# Patient Record
Sex: FEMALE | Race: WHITE | NOT HISPANIC OR LATINO | URBAN - METROPOLITAN AREA
[De-identification: names, ages, dates, MRNs, and addresses within clinical notes are randomized per-mention and may not be internally consistent; named-entity substitution may affect disease eponyms.]

---

## 2017-01-30 ENCOUNTER — OUTPATIENT (OUTPATIENT)
Dept: OUTPATIENT SERVICES | Facility: HOSPITAL | Age: 42
LOS: 1 days | End: 2017-01-30
Payer: COMMERCIAL

## 2017-01-30 DIAGNOSIS — Z3A.37 37 WEEKS GESTATION OF PREGNANCY: ICD-10-CM

## 2017-01-30 LAB
HCT VFR BLD CALC: 35.8 % — SIGNIFICANT CHANGE UP (ref 34.5–45)
HGB BLD-MCNC: 12.3 G/DL — SIGNIFICANT CHANGE UP (ref 11.5–15.5)
MCHC RBC-ENTMCNC: 30.8 PG — SIGNIFICANT CHANGE UP (ref 27–34)
MCHC RBC-ENTMCNC: 34.4 G/DL — SIGNIFICANT CHANGE UP (ref 32–36)
MCV RBC AUTO: 89.7 FL — SIGNIFICANT CHANGE UP (ref 80–100)
PLATELET # BLD AUTO: 275 K/UL — SIGNIFICANT CHANGE UP (ref 150–400)
RBC # BLD: 3.99 M/UL — SIGNIFICANT CHANGE UP (ref 3.8–5.2)
RBC # FLD: 13 % — SIGNIFICANT CHANGE UP (ref 10.3–16.9)
WBC # BLD: 9.3 K/UL — SIGNIFICANT CHANGE UP (ref 3.8–10.5)
WBC # FLD AUTO: 9.3 K/UL — SIGNIFICANT CHANGE UP (ref 3.8–10.5)

## 2017-01-30 PROCEDURE — 86901 BLOOD TYPING SEROLOGIC RH(D): CPT

## 2017-01-30 PROCEDURE — 85027 COMPLETE CBC AUTOMATED: CPT

## 2017-01-30 PROCEDURE — 86900 BLOOD TYPING SEROLOGIC ABO: CPT

## 2017-01-30 PROCEDURE — 86850 RBC ANTIBODY SCREEN: CPT

## 2017-01-30 PROCEDURE — 86780 TREPONEMA PALLIDUM: CPT

## 2017-01-31 ENCOUNTER — INPATIENT (INPATIENT)
Facility: HOSPITAL | Age: 42
LOS: 2 days | Discharge: HOME | End: 2017-02-03
Attending: OBSTETRICS & GYNECOLOGY | Admitting: OBSTETRICS & GYNECOLOGY
Payer: COMMERCIAL

## 2017-01-31 VITALS — HEIGHT: 62 IN | WEIGHT: 163.14 LBS

## 2017-01-31 LAB — T PALLIDUM AB TITR SER: NEGATIVE — SIGNIFICANT CHANGE UP

## 2017-01-31 RX ORDER — SODIUM CHLORIDE 9 MG/ML
1000 INJECTION, SOLUTION INTRAVENOUS ONCE
Qty: 0 | Refills: 0 | Status: COMPLETED | OUTPATIENT
Start: 2017-01-31 | End: 2017-01-31

## 2017-01-31 RX ORDER — SODIUM CHLORIDE 9 MG/ML
1000 INJECTION, SOLUTION INTRAVENOUS
Qty: 0 | Refills: 0 | Status: DISCONTINUED | OUTPATIENT
Start: 2017-01-31 | End: 2017-01-31

## 2017-01-31 RX ORDER — GLYCERIN ADULT
1 SUPPOSITORY, RECTAL RECTAL AT BEDTIME
Qty: 0 | Refills: 0 | Status: DISCONTINUED | OUTPATIENT
Start: 2017-01-31 | End: 2017-02-03

## 2017-01-31 RX ORDER — OXYTOCIN 10 UNIT/ML
333.33 VIAL (ML) INJECTION
Qty: 20 | Refills: 0 | Status: COMPLETED | OUTPATIENT
Start: 2017-01-31 | End: 2017-01-31

## 2017-01-31 RX ORDER — DIPHENHYDRAMINE HCL 50 MG
25 CAPSULE ORAL EVERY 6 HOURS
Qty: 0 | Refills: 0 | Status: DISCONTINUED | OUTPATIENT
Start: 2017-01-31 | End: 2017-02-03

## 2017-01-31 RX ORDER — LANOLIN
1 OINTMENT (GRAM) TOPICAL
Qty: 0 | Refills: 0 | Status: DISCONTINUED | OUTPATIENT
Start: 2017-01-31 | End: 2017-02-03

## 2017-01-31 RX ORDER — CEFAZOLIN SODIUM 1 G
2000 VIAL (EA) INJECTION ONCE
Qty: 0 | Refills: 0 | Status: COMPLETED | OUTPATIENT
Start: 2017-01-31 | End: 2017-01-31

## 2017-01-31 RX ORDER — ACETAMINOPHEN 500 MG
650 TABLET ORAL EVERY 6 HOURS
Qty: 0 | Refills: 0 | Status: DISCONTINUED | OUTPATIENT
Start: 2017-01-31 | End: 2017-02-03

## 2017-01-31 RX ORDER — FERROUS SULFATE 325(65) MG
325 TABLET ORAL DAILY
Qty: 0 | Refills: 0 | Status: DISCONTINUED | OUTPATIENT
Start: 2017-01-31 | End: 2017-01-31

## 2017-01-31 RX ORDER — NALOXONE HYDROCHLORIDE 4 MG/.1ML
0.1 SPRAY NASAL
Qty: 0 | Refills: 0 | Status: DISCONTINUED | OUTPATIENT
Start: 2017-01-31 | End: 2017-02-03

## 2017-01-31 RX ORDER — TETANUS TOXOID, REDUCED DIPHTHERIA TOXOID AND ACELLULAR PERTUSSIS VACCINE, ADSORBED 5; 2.5; 8; 8; 2.5 [IU]/.5ML; [IU]/.5ML; UG/.5ML; UG/.5ML; UG/.5ML
0.5 SUSPENSION INTRAMUSCULAR ONCE
Qty: 0 | Refills: 0 | Status: COMPLETED | OUTPATIENT
Start: 2017-01-31

## 2017-01-31 RX ORDER — IBUPROFEN 200 MG
600 TABLET ORAL EVERY 6 HOURS
Qty: 0 | Refills: 0 | Status: DISCONTINUED | OUTPATIENT
Start: 2017-01-31 | End: 2017-02-03

## 2017-01-31 RX ORDER — DOCUSATE SODIUM 100 MG
100 CAPSULE ORAL
Qty: 0 | Refills: 0 | Status: DISCONTINUED | OUTPATIENT
Start: 2017-01-31 | End: 2017-01-31

## 2017-01-31 RX ORDER — OXYTOCIN 10 UNIT/ML
41.67 VIAL (ML) INJECTION
Qty: 20 | Refills: 0 | Status: DISCONTINUED | OUTPATIENT
Start: 2017-01-31 | End: 2017-02-03

## 2017-01-31 RX ORDER — TETANUS TOXOID, REDUCED DIPHTHERIA TOXOID AND ACELLULAR PERTUSSIS VACCINE, ADSORBED 5; 2.5; 8; 8; 2.5 [IU]/.5ML; [IU]/.5ML; UG/.5ML; UG/.5ML; UG/.5ML
0.5 SUSPENSION INTRAMUSCULAR ONCE
Qty: 0 | Refills: 0 | Status: DISCONTINUED | OUTPATIENT
Start: 2017-01-31 | End: 2017-01-31

## 2017-01-31 RX ORDER — HEPARIN SODIUM 5000 [USP'U]/ML
5000 INJECTION INTRAVENOUS; SUBCUTANEOUS EVERY 12 HOURS
Qty: 0 | Refills: 0 | Status: DISCONTINUED | OUTPATIENT
Start: 2017-01-31 | End: 2017-01-31

## 2017-01-31 RX ORDER — SIMETHICONE 80 MG/1
80 TABLET, CHEWABLE ORAL EVERY 4 HOURS
Qty: 0 | Refills: 0 | Status: DISCONTINUED | OUTPATIENT
Start: 2017-01-31 | End: 2017-02-03

## 2017-01-31 RX ORDER — ONDANSETRON 8 MG/1
4 TABLET, FILM COATED ORAL EVERY 6 HOURS
Qty: 0 | Refills: 0 | Status: DISCONTINUED | OUTPATIENT
Start: 2017-01-31 | End: 2017-02-03

## 2017-01-31 RX ORDER — CITRIC ACID/SODIUM CITRATE 300-500 MG
30 SOLUTION, ORAL ORAL ONCE
Qty: 0 | Refills: 0 | Status: DISCONTINUED | OUTPATIENT
Start: 2017-01-31 | End: 2017-01-31

## 2017-01-31 RX ORDER — OXYTOCIN 10 UNIT/ML
41.67 VIAL (ML) INJECTION
Qty: 20 | Refills: 0 | Status: DISCONTINUED | OUTPATIENT
Start: 2017-01-31 | End: 2017-01-31

## 2017-01-31 RX ORDER — FERROUS SULFATE 325(65) MG
325 TABLET ORAL DAILY
Qty: 0 | Refills: 0 | Status: DISCONTINUED | OUTPATIENT
Start: 2017-01-31 | End: 2017-02-03

## 2017-01-31 RX ORDER — IBUPROFEN 200 MG
600 TABLET ORAL ONCE
Qty: 0 | Refills: 0 | Status: DISCONTINUED | OUTPATIENT
Start: 2017-01-31 | End: 2017-02-03

## 2017-01-31 RX ORDER — DOCUSATE SODIUM 100 MG
100 CAPSULE ORAL
Qty: 0 | Refills: 0 | Status: DISCONTINUED | OUTPATIENT
Start: 2017-01-31 | End: 2017-02-03

## 2017-01-31 RX ORDER — CITRIC ACID/SODIUM CITRATE 300-500 MG
30 SOLUTION, ORAL ORAL ONCE
Qty: 0 | Refills: 0 | Status: COMPLETED | OUTPATIENT
Start: 2017-01-31 | End: 2017-01-31

## 2017-01-31 RX ORDER — DIPHENHYDRAMINE HCL 50 MG
25 CAPSULE ORAL EVERY 6 HOURS
Qty: 0 | Refills: 0 | Status: DISCONTINUED | OUTPATIENT
Start: 2017-01-31 | End: 2017-01-31

## 2017-01-31 RX ORDER — SODIUM CHLORIDE 9 MG/ML
1000 INJECTION, SOLUTION INTRAVENOUS
Qty: 0 | Refills: 0 | Status: DISCONTINUED | OUTPATIENT
Start: 2017-01-31 | End: 2017-02-03

## 2017-01-31 RX ORDER — ACETAMINOPHEN 500 MG
650 TABLET ORAL EVERY 6 HOURS
Qty: 0 | Refills: 0 | Status: DISCONTINUED | OUTPATIENT
Start: 2017-01-31 | End: 2017-01-31

## 2017-01-31 RX ORDER — GLYCERIN ADULT
1 SUPPOSITORY, RECTAL RECTAL AT BEDTIME
Qty: 0 | Refills: 0 | Status: DISCONTINUED | OUTPATIENT
Start: 2017-01-31 | End: 2017-01-31

## 2017-01-31 RX ORDER — HEPARIN SODIUM 5000 [USP'U]/ML
5000 INJECTION INTRAVENOUS; SUBCUTANEOUS EVERY 12 HOURS
Qty: 0 | Refills: 0 | Status: CANCELLED | OUTPATIENT
Start: 2017-02-01 | End: 2017-01-31

## 2017-01-31 RX ORDER — METOCLOPRAMIDE HCL 10 MG
10 TABLET ORAL ONCE
Qty: 0 | Refills: 0 | Status: DISCONTINUED | OUTPATIENT
Start: 2017-01-31 | End: 2017-01-31

## 2017-01-31 RX ORDER — SIMETHICONE 80 MG/1
80 TABLET, CHEWABLE ORAL EVERY 4 HOURS
Qty: 0 | Refills: 0 | Status: DISCONTINUED | OUTPATIENT
Start: 2017-01-31 | End: 2017-01-31

## 2017-01-31 RX ORDER — SCOPALAMINE 1 MG/3D
1.5 PATCH, EXTENDED RELEASE TRANSDERMAL ONCE
Qty: 0 | Refills: 0 | Status: COMPLETED | OUTPATIENT
Start: 2017-01-31 | End: 2017-01-31

## 2017-01-31 RX ORDER — IBUPROFEN 200 MG
600 TABLET ORAL EVERY 6 HOURS
Qty: 0 | Refills: 0 | Status: DISCONTINUED | OUTPATIENT
Start: 2017-01-31 | End: 2017-01-31

## 2017-01-31 RX ORDER — SODIUM CHLORIDE 9 MG/ML
500 INJECTION, SOLUTION INTRAVENOUS
Qty: 0 | Refills: 0 | Status: DISCONTINUED | OUTPATIENT
Start: 2017-01-31 | End: 2017-02-02

## 2017-01-31 RX ORDER — LANOLIN
1 OINTMENT (GRAM) TOPICAL
Qty: 0 | Refills: 0 | Status: DISCONTINUED | OUTPATIENT
Start: 2017-01-31 | End: 2017-01-31

## 2017-01-31 RX ADMIN — SODIUM CHLORIDE 125 MILLILITER(S): 9 INJECTION, SOLUTION INTRAVENOUS at 16:56

## 2017-01-31 RX ADMIN — Medication 125 MILLIUNIT(S)/MIN: at 12:46

## 2017-01-31 RX ADMIN — Medication 30 MILLILITER(S): at 07:35

## 2017-01-31 RX ADMIN — SODIUM CHLORIDE 125 MILLILITER(S): 9 INJECTION, SOLUTION INTRAVENOUS at 07:37

## 2017-01-31 RX ADMIN — Medication 1000 MILLIUNIT(S)/MIN: at 12:45

## 2017-01-31 RX ADMIN — SCOPALAMINE 1.5 MILLIGRAM(S): 1 PATCH, EXTENDED RELEASE TRANSDERMAL at 07:35

## 2017-01-31 RX ADMIN — Medication 125 MILLIUNIT(S)/MIN: at 12:45

## 2017-01-31 RX ADMIN — SODIUM CHLORIDE 999 MILLILITER(S): 9 INJECTION, SOLUTION INTRAVENOUS at 13:30

## 2017-01-31 RX ADMIN — Medication 100 MILLIGRAM(S): at 07:35

## 2017-01-31 RX ADMIN — Medication 600 MILLIGRAM(S): at 18:10

## 2017-01-31 RX ADMIN — SODIUM CHLORIDE 2000 MILLILITER(S): 9 INJECTION, SOLUTION INTRAVENOUS at 06:39

## 2017-01-31 RX ADMIN — SIMETHICONE 80 MILLIGRAM(S): 80 TABLET, CHEWABLE ORAL at 18:10

## 2017-01-31 RX ADMIN — Medication 600 MILLIGRAM(S): at 19:00

## 2017-01-31 NOTE — DISCHARGE NOTE OB - CARE PLAN
Principal Discharge DX:	Normal postpartum course  Goal:	home  Instructions for follow-up, activity and diet:	No heavy lifting nothing per  vagina for seix  weeks  no sex  no tub  baths  no swimming

## 2017-01-31 NOTE — DISCHARGE NOTE OB - MATERIALS PROVIDED
[facility] [place] Screening Program/Shaken Baby Prevention Handout/[facility] Hearing Screen Program/Breastfeeding Log/  Immunization Record/Guide to Postpartum Care/Vaccinations/Back To Sleep Handout

## 2017-01-31 NOTE — PROVIDER CONTACT NOTE (OTHER) - SITUATION
Spoke with Dr. Reese re: HR of 45, apically. Asymptomatic. MD will order EKG and order bolus of D5LR. JUSTIN [place].

## 2017-01-31 NOTE — PROGRESS NOTE ADULT - SUBJECTIVE AND OBJECTIVE BOX
Section Operative Note      Pre-Op Diagnosis:  Repeat  section,  Multiparity  desired sterility       Post-Op Diagnosis:same       Time Out: 	[x ] Performed		[ ]Not Performed:  Procedure: 	 Section: 	[x ] Repeat 	#___3____	[ ] Primary         [ ]Emergency	        [ ]Other____________:  Uterine incision:         [x ]Low Transverse C/S	[ ]Low Vertical C/S           [ ]Classical C/S							  Additional Procedures: 	[ x] Bilateral(RIGHT) Tubal Ligation.  Type:_Modofoied lalo _____________  Other:	[ ]Lysis of Adhesions   	[ ]C-Hysterectomy          [ ]Other__________________:  Surgeon:  Dr. Dr Presley                                            Assist:  Dr Reginald Han                                                                               .   Anesthesia: ____Dr Alonso__________________________	Type:	[x ]Other:Combined  Spinal /epid   IV FluidsIVRL 1600cc,  2g  acnef  ,  20  u  pitocin					Urine Output:400cc clear Lopes:  [ x] Yes [ ]No [ ] Other:  EBL:   900cc 		Transfusion:  [ x]NO  [ ]YES/Amount:  Delivery findings:    [ x] Live 	[ ]Non-Viable: 	[ x]MALE   [ ]FEMALE, Infant. APGAR       9       AND          9     [x ] Peds at delivery.  [ ]MULTIPLE GESTATION -NOTES:      POSITION:                        PRESENTATION                                .  DELIVERED BY:  	[x ]HEAD 		[ ]BREECH 	[ ]OTHER:  			[ ]MANUAL 		[ ]VACUUM	[ ] OTHER:	  PLACENTA: 	[ x]Removed	[ x]Uterus explored		[ x]Empty		[ ]Other 		  		UTERUS:  	[x ]Normal		[ ]Fibroids		[ ] Other:  ADNEXA: 	[x ]Right Normal	[ ]Other:  		[ ]Left Normal	[x ]Other SURGICALLY ABSENT :  		PELVIS:		[ x]Normal		[ ]Adhesions [ ]Mild  [ ]Moderate [ ]Severe  Procedure:  	Patient in supine position.    Skin Incision	[X ]Pfannenstiel	[ ]Other:			[ X]Old scar  removal.  		Fascial Incision	[ X]Transverse 	[ ]Other:		  Peritoneal incision	[X ]Performed	[ ]Vertical  [ ]Other:		[ ]Lysis of Adhesions  		Bladder Flap	[ ]Created	[ X]Not Created  		Uterine Incision	[X ]Low transverse	[ ]Low Vertical	[ ]Classical  [ ]Other:   	Uterine Repair	[ X]_#___1_____Layers-Using__1 CHROMIC______________ sutures 	[X ] hemostasis  excellent.  						[ ]Other:                       .                      Abdominal Cavity	[X ]Cleared of clots and debris  Rectus  Muscles  	Reapproximated [X ]Yes Using___1CHROMIC_____________ sutures. [ ]Not Re- Approximated.  Fascial Reapproximation 	[ X]UsingUsing___1 VICRIL_____________ sutures [ ]Other:                                 .	  Subcutaneous Tissue 	[X ]Irrigated  and hemostasis assured:[X ]Reapproximated Using_____2-0 PLAIN___________ sutures.  Skin Reapproximation:	[X ]Subcuticular Using________________ sutures [X ] Insorb Staples   [ ]Skin Staples [ ]Other:  Dressing applied  and Patient to RR in  [X ] Stable [ ] Other ;                         condition.	  End of Operation;	[ X] Sponge/lap/needle count correct.  [ ] Not correct.  [ ]Not Done [ ]X-ray=Clear  Pathology:	[ X]Placenta.   	[ ]Fallopian Tube Portions [ ]Right [ ]Left.	[ ]Other:                                           	Complications/Attending’s Notes:	[ ] None.  	[X ] Other:   PT  REAFFRMED MHER DESIRE  FOR GETTING A  TUBAL  LIGATION PRIOR  TO PERFORMING IT  ON  OR                                                                      [ ]CONTINUATION OF NOTES NEXT PAGE:  Section Operative Note      Pre-Op Diagnosis:  Repeat  section, posterios  low  lyiing placenta  Multiparity  desired sterility       Post-Op Diagnosis:same       Time Out: 	[x ] Performed		[ ]Not Performed:  Procedure: 	 Section: 	[x ] Repeat 	#___3____	[ ] Primary         [ ]Emergency	        [ ]Other____________:  Uterine incision:         [x ]Low Transverse C/S	[ ]Low Vertical C/S           [ ]Classical C/S							  Additional Procedures: 	[ x] Bilateral(RIGHT) Tubal Ligation.  Type:_Modofoied lalo _____________  Other:	[ ]Lysis of Adhesions   	[ ]C-Hysterectomy          [ ]Other__________________:  Surgeon:  Dr. Dr Presley                                            Assist:  Dr Reginald Han                                                                               .   Anesthesia: ____Dr Alonso__________________________	Type:	[x ]Other:Combined  Spinal /epid   IV FluidsIVRL 1600cc,  2g  acnef  ,  20  u  pitocin					Urine Output:400cc clear Lopes:  [ x] Yes [ ]No [ ] Other:  EBL:   900cc 		Transfusion:  [ x]NO  [ ]YES/Amount:  Delivery findings:    [ x] Live 	[ ]Non-Viable: 	[ x]MALE   [ ]FEMALE, Infant. APGAR       9       AND          9     [x ] Peds at delivery.  [ ]MULTIPLE GESTATION -NOTES:      POSITION:                        PRESENTATION                                .  DELIVERED BY:  	[x ]HEAD 		[ ]BREECH 	[ ]OTHER:  			[ ]MANUAL 		[ ]VACUUM	[ ] OTHER:	  PLACENTA: 	[ x]Removed	[ x]Uterus explored		[ x]Empty		[ ]Other 		  		UTERUS:  	[x ]Normal		[ ]Fibroids		[ ] Other:  ADNEXA: 	[x ]Right Normal	[ ]Other:  		[ ]Left Normal	[x ]Other SURGICALLY ABSENT :  		PELVIS:		[ x]Normal		[ ]Adhesions [ ]Mild  [ ]Moderate [ ]Severe  Procedure:  	Patient in supine position.    Skin Incision	[X ]Pfannenstiel	[ ]Other:			[ X]Old scar  removal.  		Fascial Incision	[ X]Transverse 	[ ]Other:		  Peritoneal incision	[X ]Performed	[ ]Vertical  [ ]Other:		[ ]Lysis of Adhesions  		Bladder Flap	[ ]Created	[ X]Not Created  		Uterine Incision	[X ]Low transverse	[ ]Low Vertical	[ ]Classical  [ ]Other:   	Uterine Repair	[ X]_#___1_____Layers-Using__1 CHROMIC______________ sutures 	[X ] hemostasis  excellent.  						[ ]Other:                       .                      Abdominal Cavity	[X ]Cleared of clots and debris  Rectus  Muscles  	Reapproximated [X ]Yes Using___1CHROMIC_____________ sutures. [ ]Not Re- Approximated.  Fascial Reapproximation 	[ X]UsingUsing___1 VICRIL_____________ sutures [ ]Other:                                 .	  Subcutaneous Tissue 	[X ]Irrigated  and hemostasis assured:[X ]Reapproximated Using_____2-0 PLAIN___________ sutures.  Skin Reapproximation:	[X ]Subcuticular Using________________ sutures [X ] Insorb Staples   [ ]Skin Staples [ ]Other:  Dressing applied  and Patient to RR in  [X ] Stable [ ] Other ;                         condition.	  End of Operation;	[ X] Sponge/lap/needle count correct.  [ ] Not correct.  [ ]Not Done [ ]X-ray=Clear  Pathology:	[ X]Placenta.   	[ ]Fallopian Tube Portions [ ]Right [ ]Left.	[ ]Other:                                           	Complications/Attending’s Notes:	[ ] None.  	[X ] Other:   PT  REAFFRMED MHER DESIRE  FOR GETTING A  TUBAL  LIGATION PRIOR  TO PERFORMING IT  ON  OR                                                                      [ ]CONTINUATION OF NOTES NEXT PAGE:

## 2017-01-31 NOTE — DISCHARGE NOTE OB - PATIENT PORTAL LINK FT
“You can access the [employer] Patient Portal, offered by [facility], by registering with the following website: [email or web address]”

## 2017-01-31 NOTE — DISCHARGE NOTE OB - CARE PROVIDER_API CALL
Jayson Presley), Obstetrics and Gynecology  [address] [place] [id number]  Phone: [phone]  Fax: [phone]

## 2017-01-31 NOTE — DISCHARGE NOTE OB - HOSPITAL COURSE
repeat  section,  low  lying -placent  multiparity  desired  sterillity ,  live male  infant  apgar  9  and 9 ,  intact placenta  3 vessel  cors  normal  rioght fallopian tube , surgically  absent  left  fallopian tube.  ebl  900.  modified  poleroy  right utbal  ligation

## 2017-02-01 RX ADMIN — Medication 600 MILLIGRAM(S): at 01:00

## 2017-02-01 RX ADMIN — Medication 600 MILLIGRAM(S): at 13:33

## 2017-02-01 RX ADMIN — Medication 100 MILLIGRAM(S): at 12:18

## 2017-02-01 RX ADMIN — Medication 600 MILLIGRAM(S): at 20:00

## 2017-02-01 RX ADMIN — Medication 600 MILLIGRAM(S): at 07:13

## 2017-02-01 RX ADMIN — Medication 325 MILLIGRAM(S): at 12:18

## 2017-02-01 RX ADMIN — Medication 600 MILLIGRAM(S): at 14:55

## 2017-02-01 RX ADMIN — Medication 600 MILLIGRAM(S): at 19:28

## 2017-02-01 RX ADMIN — Medication 600 MILLIGRAM(S): at 06:13

## 2017-02-01 RX ADMIN — SODIUM CHLORIDE 125 MILLILITER(S): 9 INJECTION, SOLUTION INTRAVENOUS at 00:57

## 2017-02-01 RX ADMIN — Medication 1 TABLET(S): at 12:18

## 2017-02-01 RX ADMIN — Medication 100 MILLIGRAM(S): at 00:03

## 2017-02-01 RX ADMIN — Medication 600 MILLIGRAM(S): at 00:03

## 2017-02-01 NOTE — PROGRESS NOTE ADULT - SUBJECTIVE AND OBJECTIVE BOX
OB/GYN ATTENDING POSTOP ROUNDS                                    Subjective:  41yFemale  Complaints: [x ]None	[ ]Other:      Objective:  		Vital Signs:  T(C): 36.7, Max: 36.8 ( @ 16:46)  HR: 65 (45 - 68)  BP: 97/46 (86/44 - 106/64)  RR: 16 (14 - 17)  SpO2: 97% (95% - 99%)  Wt(kg): --    I & Os for current day (as of  @ 09:41)  =============================================  IN: 1600 ml / OUT: 6110 ml / NET: -4510 ml      		General:      NAD 	[x ] Yes 	[ ]No,	 A&O X3	[ x] Yes  [ ] No			  		Abdomen:   Palpation  	[x ]Normal	[ ]Other:	  	   Incision:              [x  ]Intact	   [ x] Clean	[x ] Dry    [   ]other:  BS		[ x]Normal 	[ ]Other:  			  LE:  	Calf tenderness    	[ x]None		[x ]No  Sign of DVT	[ ]Other:  		Lochia:	 		[ x]Normal,	[ ]Other:  		Labs:	                      12.3   9.3   )-----------( 275      ( 2017 18:02 )             35.8   	       Assessment:  			[x ] Post-op  Section  			Day #________  				[ ] Other:     	Plan:	1.  Supportive Care		[ ]Other:                                           2. Pain:		[ x] Well Controlled 	[ ] Other:	           	     	3. Misc.		[ x]Continue current care                                            4. Discharge home on 2/3/17 if stable	[ ] Other:  		  Notes:			[x ] None			[ ] Other:      													Jayson Presley MD [phone]

## 2017-02-01 NOTE — PROGRESS NOTE ADULT - SUBJECTIVE AND OBJECTIVE BOX
Patient evaluated at bedside.   She reports pain is well controlled with  She denies headache, dizziness, chest pain, palpitations, shortness of breathe, nausea, vomiting or heavy vaginal bleeding.  She has been ambulating without assistance, voiding spontaneously, passing gas, tolerating regular diet and is breastfeeding.    Physical Exam:  Vital Signs Last 24 Hrs  T(C): 36.7, Max: 36.8 (01-31 @ 16:46)  T(F): 98.1, Max: 98.2 (01-31 @ 16:46)  HR: 65 (45 - 69)  BP: 97/46 (86/44 - 119/70)  BP(mean): --  RR: 16 (14 - 17)  SpO2: 97% (95% - 99%)    GA: NAD, A+0 x 3  CV: RRR  Pulm: CTAB  Breasts: soft, nontender, no palpable masses  Abd: + BS, soft, nontender, nondistended, no rebound or guarding, incision clean, dry and intact, uterus firm at midline,  fb below umbilicus  : lochia WNL  Lopez: s/p lopez- TOV  Extremities: no swelling or calf tenderness, reflexes +2 bilaterally                            12.3   9.3   )-----------( 275      ( 30 Jan 2017 18:02 )             35.8

## 2017-02-01 NOTE — PROGRESS NOTE ADULT - ASSESSMENT
A/P  yo 41y    s/p c/s, POD # 1 ,stable  1. Pain:opm  2. GI:regular diet  3. : TOV  4. DVT prophylaxis:SCD  5. Dispo:POD#4  6.CBC pending collection

## 2017-02-02 LAB
HCT VFR BLD CALC: 32.1 % — LOW (ref 34.5–45)
HGB BLD-MCNC: 10.5 G/DL — LOW (ref 11.5–15.5)
MCHC RBC-ENTMCNC: 30.3 PG — SIGNIFICANT CHANGE UP (ref 27–34)
MCHC RBC-ENTMCNC: 32.7 G/DL — SIGNIFICANT CHANGE UP (ref 32–36)
MCV RBC AUTO: 92.8 FL — SIGNIFICANT CHANGE UP (ref 80–100)
PLATELET # BLD AUTO: 224 K/UL — SIGNIFICANT CHANGE UP (ref 150–400)
RBC # BLD: 3.46 M/UL — LOW (ref 3.8–5.2)
RBC # FLD: 13.8 % — SIGNIFICANT CHANGE UP (ref 10.3–16.9)
WBC # BLD: 9.9 K/UL — SIGNIFICANT CHANGE UP (ref 3.8–10.5)
WBC # FLD AUTO: 9.9 K/UL — SIGNIFICANT CHANGE UP (ref 3.8–10.5)

## 2017-02-02 RX ADMIN — Medication 600 MILLIGRAM(S): at 01:34

## 2017-02-02 RX ADMIN — Medication 100 MILLIGRAM(S): at 10:59

## 2017-02-02 RX ADMIN — Medication 100 MILLIGRAM(S): at 20:57

## 2017-02-02 RX ADMIN — Medication 600 MILLIGRAM(S): at 07:31

## 2017-02-02 RX ADMIN — Medication 600 MILLIGRAM(S): at 08:25

## 2017-02-02 RX ADMIN — Medication 600 MILLIGRAM(S): at 02:30

## 2017-02-02 RX ADMIN — Medication 325 MILLIGRAM(S): at 10:59

## 2017-02-02 RX ADMIN — Medication 600 MILLIGRAM(S): at 23:28

## 2017-02-02 RX ADMIN — Medication 1 TABLET(S): at 10:59

## 2017-02-02 RX ADMIN — Medication 600 MILLIGRAM(S): at 17:00

## 2017-02-02 RX ADMIN — Medication 600 MILLIGRAM(S): at 16:21

## 2017-02-02 NOTE — PROGRESS NOTE ADULT - SUBJECTIVE AND OBJECTIVE BOX
OB/GYN ATTENDING POSTOP ROUNDS                                    Subjective:  41yFemale.  "Im fine  I want to go home tomorrow."  Complaints: [x ]None	[ ]Other:      Objective:  		Vital Signs:  T(C): 36.8, Max: 36.9 ( @ 10:00)  HR: 74 (65 - 74)  BP: 112/59 (97/46 - 112/59)  RR: 16 (16 - 18)  SpO2: 96% (96% - 99%)  Wt(kg): --  I & Os for 24h ending  @ 07:00  =============================================  IN: 1600 ml / OUT: 6110 ml / NET: -4510 ml    I & Os for current day (as of  @ 05:31)  =============================================  IN: 0 ml / OUT: 1450 ml / NET: -1450 ml      		General:      NAD 	[x ] Yes 	[ ]No,	 A&O X3	[ x] Yes  [ ] No			  		Abdomen:   Palpation  	[x ]Normal	[ ]Other:	  	   Incision:              [x  ]Intact	   [ x] Clean	[x ] Dry    [   ]other:  BS		[ x]Normal 	[ ]Other:  			  LE:  	Calf tenderness    	[ x]None		[x ]No  Sign of DVT	[ ]Other:  		Lochia:	 		[ x]Normal,	[ ]Other:  		Labs:		       Assessment:  			[x ] Post-op  Section  			Day #________  				[ ] Other:     	Plan:	1.  Supportive Care		[ ]Other:                                           2. Pain:		[ x] Well Controlled 	[ ] Other:	           	     	3. Misc.		[ x]Continue current care                                            4. Discharge home on POD 2/3/17if stable	[ ] Other:  		  Notes:			[x ] None			[ ] Other:      													Jayson Presley MD [phone]

## 2017-02-02 NOTE — PROGRESS NOTE ADULT - ASSESSMENT
A/P  yo 41y  s/p c/s, POD # 2  ,stable  1. Pain:OPM  2. GI:Reg  3. : voiding  4. DVT prophylaxis:scd's  5. Dispo:POD#4

## 2017-02-02 NOTE — PROGRESS NOTE ADULT - SUBJECTIVE AND OBJECTIVE BOX
Patient evaluated at bedside.   She reports pain is well controlled with  She denies headache, dizziness, chest pain, palpitations, shortness of breathe, nausea, vomiting or heavy vaginal bleeding.  She has been ambulating without assistance, voiding spontaneously, passing gas, tolerating regular diet and is breastfeeding.    Physical Exam:  Vital Signs Last 24 Hrs  T(C): 36.4, Max: 36.9 (02-01 @ 10:00)  T(F): 97.5, Max: 98.5 (02-01 @ 10:00)  HR: 67 (67 - 74)  BP: 93/54 (93/54 - 112/59)  RR: 16 (16 - 18)  SpO2: 99% (96% - 99%)    GA: NAD, A+0 x 3  CV: RRR  Pulm: CTAB  Breasts: soft, nontender, no palpable masses  Abd: + BS, soft, nontender, nondistended, no rebound or guarding, incision clean, dry and intact, uterus firm at midline,  fb below umbilicus  : lochia WNL  Extremities: no swelling or calf tenderness, reflexes +2 bilaterally

## 2017-02-03 VITALS
SYSTOLIC BLOOD PRESSURE: 124 MMHG | DIASTOLIC BLOOD PRESSURE: 74 MMHG | OXYGEN SATURATION: 98 % | TEMPERATURE: 97 F | RESPIRATION RATE: 18 BRPM | HEART RATE: 72 BPM

## 2017-02-03 LAB — SURGICAL PATHOLOGY STUDY: SIGNIFICANT CHANGE UP

## 2017-02-03 PROCEDURE — 90715 TDAP VACCINE 7 YRS/> IM: CPT

## 2017-02-03 PROCEDURE — 88307 TISSUE EXAM BY PATHOLOGIST: CPT

## 2017-02-03 PROCEDURE — 88304 TISSUE EXAM BY PATHOLOGIST: CPT

## 2017-02-03 PROCEDURE — 85027 COMPLETE CBC AUTOMATED: CPT

## 2017-02-03 PROCEDURE — 36415 COLL VENOUS BLD VENIPUNCTURE: CPT

## 2017-02-03 RX ORDER — TETANUS TOXOID, REDUCED DIPHTHERIA TOXOID AND ACELLULAR PERTUSSIS VACCINE, ADSORBED 5; 2.5; 8; 8; 2.5 [IU]/.5ML; [IU]/.5ML; UG/.5ML; UG/.5ML; UG/.5ML
0.5 SUSPENSION INTRAMUSCULAR ONCE
Qty: 0 | Refills: 0 | Status: COMPLETED | OUTPATIENT
Start: 2017-02-03 | End: 2017-02-03

## 2017-02-03 RX ORDER — METOCLOPRAMIDE HCL 10 MG
10 TABLET ORAL EVERY 6 HOURS
Qty: 0 | Refills: 0 | Status: DISCONTINUED | OUTPATIENT
Start: 2017-02-03 | End: 2017-02-03

## 2017-02-03 RX ADMIN — Medication 600 MILLIGRAM(S): at 00:30

## 2017-02-03 RX ADMIN — Medication 100 MILLIGRAM(S): at 12:36

## 2017-02-03 RX ADMIN — Medication 1 TABLET(S): at 12:36

## 2017-02-03 RX ADMIN — TETANUS TOXOID, REDUCED DIPHTHERIA TOXOID AND ACELLULAR PERTUSSIS VACCINE, ADSORBED 0.5 MILLILITER(S): 5; 2.5; 8; 8; 2.5 SUSPENSION INTRAMUSCULAR at 04:05

## 2017-02-03 RX ADMIN — Medication 600 MILLIGRAM(S): at 12:36

## 2017-02-03 RX ADMIN — Medication 600 MILLIGRAM(S): at 13:07

## 2017-02-03 RX ADMIN — Medication 600 MILLIGRAM(S): at 07:20

## 2017-02-03 RX ADMIN — Medication 325 MILLIGRAM(S): at 12:36

## 2017-02-03 RX ADMIN — Medication 600 MILLIGRAM(S): at 06:21

## 2017-02-03 NOTE — PROGRESS NOTE ADULT - ASSESSMENT
A/P  yo 41y    s/p c/s, POD #3  ,stable  1. Pain:opm  2. GI:regular  3. : voiding  4. DVT prophylaxis:scd  5. Dispo:today

## 2017-02-06 DIAGNOSIS — O44.43 LOW LYING PLACENTA NOS OR WITHOUT HEMORRHAGE, THIRD TRIMESTER: ICD-10-CM

## 2017-02-06 DIAGNOSIS — Z30.2 ENCOUNTER FOR STERILIZATION: ICD-10-CM

## 2017-02-06 DIAGNOSIS — Z34.83 ENCOUNTER FOR SUPERVISION OF OTHER NORMAL PREGNANCY, THIRD TRIMESTER: ICD-10-CM

## 2017-02-06 DIAGNOSIS — Z88.2 ALLERGY STATUS TO SULFONAMIDES: ICD-10-CM

## 2017-02-06 DIAGNOSIS — Z3A.38 38 WEEKS GESTATION OF PREGNANCY: ICD-10-CM

## 2017-02-06 DIAGNOSIS — O34.211 MATERNAL CARE FOR LOW TRANSVERSE SCAR FROM PREVIOUS CESAREAN DELIVERY: ICD-10-CM

## 2018-10-09 NOTE — PATIENT PROFILE OB - NSTOBACCONEVERSMOKERY/N_GEN_A
How Severe Is Your Skin Lesion?: moderate Has Your Skin Lesion Been Treated?: not been treated Is This A New Presentation, Or A Follow-Up?: Growth No
